# Patient Record
Sex: FEMALE | Race: OTHER | HISPANIC OR LATINO | URBAN - METROPOLITAN AREA
[De-identification: names, ages, dates, MRNs, and addresses within clinical notes are randomized per-mention and may not be internally consistent; named-entity substitution may affect disease eponyms.]

---

## 2018-07-30 ENCOUNTER — EMERGENCY (EMERGENCY)
Facility: HOSPITAL | Age: 28
LOS: 1 days | Discharge: HOME | End: 2018-07-30
Attending: EMERGENCY MEDICINE | Admitting: EMERGENCY MEDICINE
Payer: COMMERCIAL

## 2018-07-30 VITALS
TEMPERATURE: 99 F | SYSTOLIC BLOOD PRESSURE: 153 MMHG | OXYGEN SATURATION: 100 % | RESPIRATION RATE: 18 BRPM | DIASTOLIC BLOOD PRESSURE: 88 MMHG | HEART RATE: 98 BPM | HEIGHT: 65 IN | WEIGHT: 141.54 LBS

## 2018-07-30 VITALS
OXYGEN SATURATION: 100 % | HEART RATE: 84 BPM | DIASTOLIC BLOOD PRESSURE: 66 MMHG | TEMPERATURE: 99 F | SYSTOLIC BLOOD PRESSURE: 102 MMHG | RESPIRATION RATE: 18 BRPM

## 2018-07-30 DIAGNOSIS — O21.9 VOMITING OF PREGNANCY, UNSPECIFIED: ICD-10-CM

## 2018-07-30 DIAGNOSIS — Z3A.01 LESS THAN 8 WEEKS GESTATION OF PREGNANCY: ICD-10-CM

## 2018-07-30 DIAGNOSIS — R10.13 EPIGASTRIC PAIN: ICD-10-CM

## 2018-07-30 PROCEDURE — 76815 OB US LIMITED FETUS(S): CPT

## 2018-07-30 PROCEDURE — 85025 COMPLETE CBC W/AUTO DIFF WBC: CPT

## 2018-07-30 PROCEDURE — 99284 EMERGENCY DEPT VISIT MOD MDM: CPT

## 2018-07-30 PROCEDURE — 80053 COMPREHEN METABOLIC PANEL: CPT

## 2018-07-30 PROCEDURE — 83690 ASSAY OF LIPASE: CPT

## 2018-07-30 PROCEDURE — 96374 THER/PROPH/DIAG INJ IV PUSH: CPT

## 2018-07-30 PROCEDURE — 84702 CHORIONIC GONADOTROPIN TEST: CPT

## 2018-07-30 PROCEDURE — 76801 OB US < 14 WKS SINGLE FETUS: CPT | Mod: 26

## 2018-07-30 PROCEDURE — 81003 URINALYSIS AUTO W/O SCOPE: CPT

## 2018-07-30 PROCEDURE — 76801 OB US < 14 WKS SINGLE FETUS: CPT

## 2018-07-30 PROCEDURE — 96375 TX/PRO/DX INJ NEW DRUG ADDON: CPT

## 2018-07-30 PROCEDURE — 76817 TRANSVAGINAL US OBSTETRIC: CPT | Mod: 26

## 2018-07-30 PROCEDURE — 76817 TRANSVAGINAL US OBSTETRIC: CPT

## 2018-07-30 PROCEDURE — 99284 EMERGENCY DEPT VISIT MOD MDM: CPT | Mod: 25

## 2018-07-30 RX ORDER — FAMOTIDINE 10 MG/ML
20 INJECTION INTRAVENOUS ONCE
Qty: 0 | Refills: 0 | Status: COMPLETED | OUTPATIENT
Start: 2018-07-30 | End: 2018-07-30

## 2018-07-30 RX ORDER — SODIUM CHLORIDE 9 MG/ML
1000 INJECTION INTRAMUSCULAR; INTRAVENOUS; SUBCUTANEOUS ONCE
Qty: 0 | Refills: 0 | Status: COMPLETED | OUTPATIENT
Start: 2018-07-30 | End: 2018-07-30

## 2018-07-30 RX ORDER — METOCLOPRAMIDE HCL 10 MG
10 TABLET ORAL ONCE
Qty: 0 | Refills: 0 | Status: COMPLETED | OUTPATIENT
Start: 2018-07-30 | End: 2018-07-30

## 2018-07-30 RX ADMIN — FAMOTIDINE 20 MILLIGRAM(S): 10 INJECTION INTRAVENOUS at 14:31

## 2018-07-30 RX ADMIN — SODIUM CHLORIDE 1000 MILLILITER(S): 9 INJECTION INTRAMUSCULAR; INTRAVENOUS; SUBCUTANEOUS at 14:31

## 2018-07-30 RX ADMIN — Medication 10 MILLIGRAM(S): at 14:31

## 2018-07-30 NOTE — ED PROVIDER NOTE - OBJECTIVE STATEMENT
Pt w/ no sig PMHx , LMP 1 month ago, p/w 4 days of constant, sharp, epigastric pain, non radiating. Pt reports burning discomfort as well. Pt today w/ 3 episodes of n/v. no diarrhea or constipation. no f/c. No lower abd pain. No vaginal bleeding or discharge. No F/U/D or hematuria. No sick contacts or recent travel. Pt has been trying to get pregnant. No alcohol use.

## 2018-07-30 NOTE — ED PROVIDER NOTE - CONDUCTED A DETAILED DISCUSSION WITH PATIENT AND/OR GUARDIAN REGARDING, MDM
lab results lab results/radiology results/need for outpatient follow-up/return to ED if symptoms worsen, persist or questions arise

## 2018-07-30 NOTE — ED ADULT NURSE NOTE - OBJECTIVE STATEMENT
Pt presents to ED with c/o epigastric pain x4 days with nv and feeling dizzy today, not tolerating PO. Denies fever/chills/bloody emesis or stools. Last BM this AM, described as "normal," LMP 1 month ago.

## 2018-07-30 NOTE — ED PROVIDER NOTE - PROGRESS NOTE DETAILS
Pt feeling much better. Sx resolved. HCG 51 w/ no intra or extrauterine gestation. Preg of unknown location, early preg, could still be ectopic. Repeat HCG in 2 days. Pt advised to return here vs seeing ob for repeat in 2 days. Pt provided a copy of her results and advised on return precautions. Will d/c

## 2018-07-30 NOTE — ED PROVIDER NOTE - PHYSICAL EXAMINATION
Constitutional: Well appearing, well nourished, awake, alert, oriented to person, place, time/situation and in no apparent distress.  ENMT: Airway patent. Normal MM  Eyes: Clear bilaterally  Cardiac: Normal rate, regular rhythm.  Heart sounds S1, S2.  No murmurs, rubs or gallops.  Respiratory: Breaths sounds equal and clear b/l. No increased WOB, tachypnea, hypoxia, or accessory mm use. Pt speaks in full sentences.   Gastrointestinal: Abd soft, NT, ND, NABS. No guarding, rebound, or rigidity. No pulsatile abdominal masses. No organomegaly appreciated. No CVAT   Musculoskeletal: Range of motion is not limited  Neuro: Alert and oriented, grossly non focal  Skin: Skin normal color for race, warm, dry and intact. No evidence of rash.  Psych: Alert and oriented to person, place, time/situation. normal mood and affect. no apparent risk to self or others.

## 2018-07-30 NOTE — ED PROVIDER NOTE - MEDICAL DECISION MAKING DETAILS
Pt p/w epigastric abd pain, n/v. Benign, non tender abd. +UCG. No  complaints or lower abd ttp. DDx includes abdominal pain in preg NOS, gastritis, GERD, viral illness, pancreatitis, less likely biliary colic, cholecystitis, other intra-abdominal pathology. Check labs, US, OB US. Dispo pending w/u and clinical status

## 2018-07-30 NOTE — ED PROVIDER NOTE - CARE PLAN
Principal Discharge DX:	Pregnancy  Secondary Diagnosis:	Epigastric pain  Secondary Diagnosis:	Vomiting, intractability of vomiting not specified, presence of nausea not specified, unspecified vomiting type

## 2018-07-30 NOTE — ED PROVIDER NOTE - SECONDARY DIAGNOSIS.
Epigastric pain Vomiting, intractability of vomiting not specified, presence of nausea not specified, unspecified vomiting type

## 2018-08-01 ENCOUNTER — EMERGENCY (EMERGENCY)
Facility: HOSPITAL | Age: 28
LOS: 1 days | Discharge: HOME | End: 2018-08-01
Attending: EMERGENCY MEDICINE | Admitting: EMERGENCY MEDICINE
Payer: COMMERCIAL

## 2018-08-01 VITALS
HEART RATE: 79 BPM | RESPIRATION RATE: 18 BRPM | OXYGEN SATURATION: 98 % | HEIGHT: 65 IN | SYSTOLIC BLOOD PRESSURE: 123 MMHG | TEMPERATURE: 99 F | WEIGHT: 141.98 LBS | DIASTOLIC BLOOD PRESSURE: 73 MMHG

## 2018-08-01 DIAGNOSIS — Z32.00 ENCOUNTER FOR PREGNANCY TEST, RESULT UNKNOWN: ICD-10-CM

## 2018-08-01 DIAGNOSIS — O00.90 UNSPECIFIED ECTOPIC PREGNANCY WITHOUT INTRAUTERINE PREGNANCY: ICD-10-CM

## 2018-08-01 LAB — HCG SERPL-ACNC: 43.4 MIU/ML — HIGH

## 2018-08-01 PROCEDURE — 99283 EMERGENCY DEPT VISIT LOW MDM: CPT

## 2018-08-01 PROCEDURE — 84702 CHORIONIC GONADOTROPIN TEST: CPT

## 2018-08-01 PROCEDURE — 36415 COLL VENOUS BLD VENIPUNCTURE: CPT

## 2018-08-01 NOTE — ED PROVIDER NOTE - ATTENDING CONTRIBUTION TO CARE
returns for repeat hcg.  asymptomatic.  no pain or bleeding.  hcg downtrending.  suspect non viable pregnancy.  to f/u in clinic with obgyn to trend to 0.  return to ed for pain or bleeding.  ectopic unlikely given lack of symptoms and low hcg

## 2018-08-01 NOTE — ED ADULT NURSE NOTE - CHPI ED NUR SYMPTOMS NEG
no nausea/no weakness/no chills/no vomiting/no dizziness/no fever/no tingling/no decreased eating/drinking/no pain

## 2018-08-01 NOTE — ED PROVIDER NOTE - MEDICAL DECISION MAKING DETAILS
27 yo female in the ER for repeat BHCG . 2 days ago her BHCG was 51. pt appears well, has no other complaints. no dysuria, hematuria, pelvic pain or vaginal bleeding. pt couldn't schedule an appointment to see her OB/GYN for today and came back to ER. pending lab

## 2018-08-01 NOTE — ED PROVIDER NOTE - PROGRESS NOTE DETAILS
[facility] [place]. [place] aware. s/s of ectopic pregnancy discussed. pt recommended to f/u with her OB/GYN for re-evaluation in 1-2 days. returned precautions discussed.

## 2018-08-01 NOTE — ED ADULT NURSE NOTE - NSIMPLEMENTINTERV_GEN_ALL_ED
Implemented All Universal Safety Interventions:  [place] to call system. Call bell, personal items and telephone within reach. Instruct patient to call for assistance. Room bathroom lighting operational. Non-slip footwear when patient is off stretcher. Physically safe environment: no spills, clutter or unnecessary equipment. Stretcher in lowest position, wheels locked, appropriate side rails in place.

## 2018-08-01 NOTE — ED ADULT TRIAGE NOTE - CHIEF COMPLAINT QUOTE
Patient 4 weeks pregnant came for repeat hcg level , was here 2 days ago . Denies any abdominal pain nor vaginal bleeding .

## 2018-08-01 NOTE — ED PROVIDER NOTE - OBJECTIVE STATEMENT
27 yo female , LMP 1 month ago, in the Er for repeat BHCG. pt was seen here 2 days ago , had UCG+ and BHCG was 51. Pt had pelvic US done  and no pregnancy was found .Pt was recommended to come back for repeat blood test.
